# Patient Record
Sex: FEMALE | Race: WHITE | NOT HISPANIC OR LATINO | Employment: OTHER | URBAN - METROPOLITAN AREA
[De-identification: names, ages, dates, MRNs, and addresses within clinical notes are randomized per-mention and may not be internally consistent; named-entity substitution may affect disease eponyms.]

---

## 2020-01-02 ENCOUNTER — RECORDS - HEALTHEAST (OUTPATIENT)
Dept: LAB | Facility: CLINIC | Age: 70
End: 2020-01-02

## 2020-01-03 ENCOUNTER — RECORDS - HEALTHEAST (OUTPATIENT)
Dept: LAB | Facility: CLINIC | Age: 70
End: 2020-01-03

## 2020-01-03 LAB
ALBUMIN UR-MCNC: ABNORMAL MG/DL
APPEARANCE UR: CLEAR
BACTERIA #/AREA URNS HPF: ABNORMAL HPF
BILIRUB UR QL STRIP: NEGATIVE
CAOX CRY #/AREA URNS HPF: PRESENT /[HPF]
COLOR UR AUTO: YELLOW
GLUCOSE UR STRIP-MCNC: NEGATIVE MG/DL
HGB UR QL STRIP: NEGATIVE
HYALINE CASTS #/AREA URNS LPF: ABNORMAL LPF
KETONES UR STRIP-MCNC: NEGATIVE MG/DL
LEUKOCYTE ESTERASE UR QL STRIP: ABNORMAL
MUCOUS THREADS #/AREA URNS LPF: ABNORMAL LPF
NITRATE UR QL: NEGATIVE
PH UR STRIP: 7.5 [PH] (ref 4.5–8)
RBC #/AREA URNS AUTO: ABNORMAL HPF
SP GR UR STRIP: 1.01 (ref 1–1.03)
SQUAMOUS #/AREA URNS AUTO: ABNORMAL LPF
UROBILINOGEN UR STRIP-ACNC: ABNORMAL
WBC #/AREA URNS AUTO: ABNORMAL HPF

## 2020-01-04 LAB — BACTERIA SPEC CULT: NO GROWTH

## 2020-01-06 LAB
ALT SERPL W P-5'-P-CCNC: 10 U/L (ref 0–45)
ANION GAP SERPL CALCULATED.3IONS-SCNC: 10 MMOL/L (ref 5–18)
APTT PPP: 47 SECONDS (ref 24–37)
BUN SERPL-MCNC: 8 MG/DL (ref 8–22)
CALCIUM SERPL-MCNC: 8.8 MG/DL (ref 8.5–10.5)
CHLORIDE BLD-SCNC: 105 MMOL/L (ref 98–107)
CO2 SERPL-SCNC: 22 MMOL/L (ref 22–31)
CREAT SERPL-MCNC: 0.69 MG/DL (ref 0.6–1.1)
ERYTHROCYTE [DISTWIDTH] IN BLOOD BY AUTOMATED COUNT: 13.8 % (ref 11–14.5)
GFR SERPL CREATININE-BSD FRML MDRD: >60 ML/MIN/1.73M2
GLUCOSE BLD-MCNC: 96 MG/DL (ref 70–125)
HCT VFR BLD AUTO: 29.8 % (ref 35–47)
HGB BLD-MCNC: 9.1 G/DL (ref 12–16)
MCH RBC QN AUTO: 29.1 PG (ref 27–34)
MCHC RBC AUTO-ENTMCNC: 30.5 G/DL (ref 32–36)
MCV RBC AUTO: 95 FL (ref 80–100)
PLATELET # BLD AUTO: 321 THOU/UL (ref 140–440)
PMV BLD AUTO: 8.9 FL (ref 8.5–12.5)
POTASSIUM BLD-SCNC: 4.4 MMOL/L (ref 3.5–5)
RBC # BLD AUTO: 3.13 MILL/UL (ref 3.8–5.4)
SODIUM SERPL-SCNC: 137 MMOL/L (ref 136–145)
WBC: 5.6 THOU/UL (ref 4–11)

## 2020-01-08 ENCOUNTER — RECORDS - HEALTHEAST (OUTPATIENT)
Dept: LAB | Facility: CLINIC | Age: 70
End: 2020-01-08

## 2020-01-08 LAB — INR PPP: 1.37 (ref 0.9–1.1)

## 2020-01-09 ENCOUNTER — RECORDS - HEALTHEAST (OUTPATIENT)
Dept: LAB | Facility: CLINIC | Age: 70
End: 2020-01-09

## 2020-01-10 LAB
APTT PPP: 42 SECONDS (ref 24–37)
INR PPP: 1.3 (ref 0.9–1.1)

## 2020-01-12 ENCOUNTER — RECORDS - HEALTHEAST (OUTPATIENT)
Dept: LAB | Facility: CLINIC | Age: 70
End: 2020-01-12

## 2020-01-13 LAB
ALT SERPL W P-5'-P-CCNC: <9 U/L (ref 0–45)
ANION GAP SERPL CALCULATED.3IONS-SCNC: 10 MMOL/L (ref 5–18)
BASOPHILS # BLD AUTO: 0.1 THOU/UL (ref 0–0.2)
BASOPHILS NFR BLD AUTO: 1 % (ref 0–2)
BUN SERPL-MCNC: 11 MG/DL (ref 8–22)
CALCIUM SERPL-MCNC: 9.1 MG/DL (ref 8.5–10.5)
CHLORIDE BLD-SCNC: 107 MMOL/L (ref 98–107)
CO2 SERPL-SCNC: 22 MMOL/L (ref 22–31)
CREAT SERPL-MCNC: 0.68 MG/DL (ref 0.6–1.1)
EOSINOPHIL # BLD AUTO: 0.3 THOU/UL (ref 0–0.4)
EOSINOPHIL NFR BLD AUTO: 5 % (ref 0–6)
ERYTHROCYTE [DISTWIDTH] IN BLOOD BY AUTOMATED COUNT: 14.3 % (ref 11–14.5)
GFR SERPL CREATININE-BSD FRML MDRD: >60 ML/MIN/1.73M2
GLUCOSE BLD-MCNC: 97 MG/DL (ref 70–125)
HCT VFR BLD AUTO: 34 % (ref 35–47)
HGB BLD-MCNC: 10.5 G/DL (ref 12–16)
LYMPHOCYTES # BLD AUTO: 1.7 THOU/UL (ref 0.8–4.4)
LYMPHOCYTES NFR BLD AUTO: 28 % (ref 20–40)
MCH RBC QN AUTO: 28.7 PG (ref 27–34)
MCHC RBC AUTO-ENTMCNC: 30.9 G/DL (ref 32–36)
MCV RBC AUTO: 93 FL (ref 80–100)
MONOCYTES # BLD AUTO: 0.5 THOU/UL (ref 0–0.9)
MONOCYTES NFR BLD AUTO: 8 % (ref 2–10)
NEUTROPHILS # BLD AUTO: 3.4 THOU/UL (ref 2–7.7)
NEUTROPHILS NFR BLD AUTO: 56 % (ref 50–70)
PLATELET # BLD AUTO: 312 THOU/UL (ref 140–440)
PMV BLD AUTO: 8.8 FL (ref 8.5–12.5)
POTASSIUM BLD-SCNC: 4.1 MMOL/L (ref 3.5–5)
RBC # BLD AUTO: 3.66 MILL/UL (ref 3.8–5.4)
SODIUM SERPL-SCNC: 139 MMOL/L (ref 136–145)
WBC: 6 THOU/UL (ref 4–11)

## 2020-01-17 ENCOUNTER — RECORDS - HEALTHEAST (OUTPATIENT)
Dept: LAB | Facility: CLINIC | Age: 70
End: 2020-01-17

## 2020-01-19 ENCOUNTER — RECORDS - HEALTHEAST (OUTPATIENT)
Dept: LAB | Facility: CLINIC | Age: 70
End: 2020-01-19

## 2020-01-20 LAB
ANION GAP SERPL CALCULATED.3IONS-SCNC: 12 MMOL/L (ref 5–18)
APTT PPP: 32 SECONDS (ref 24–37)
BUN SERPL-MCNC: 11 MG/DL (ref 8–22)
CALCIUM SERPL-MCNC: 9.3 MG/DL (ref 8.5–10.5)
CHLORIDE BLD-SCNC: 105 MMOL/L (ref 98–107)
CO2 SERPL-SCNC: 23 MMOL/L (ref 22–31)
CREAT SERPL-MCNC: 0.67 MG/DL (ref 0.6–1.1)
ERYTHROCYTE [DISTWIDTH] IN BLOOD BY AUTOMATED COUNT: 14.7 % (ref 11–14.5)
GFR SERPL CREATININE-BSD FRML MDRD: >60 ML/MIN/1.73M2
GLUCOSE BLD-MCNC: 90 MG/DL (ref 70–125)
HCT VFR BLD AUTO: 38 % (ref 35–47)
HGB BLD-MCNC: 11.5 G/DL (ref 12–16)
INR PPP: 1.12 (ref 0.9–1.1)
MCH RBC QN AUTO: 29 PG (ref 27–34)
MCHC RBC AUTO-ENTMCNC: 30.3 G/DL (ref 32–36)
MCV RBC AUTO: 96 FL (ref 80–100)
PLATELET # BLD AUTO: 253 THOU/UL (ref 140–440)
PMV BLD AUTO: 9.4 FL (ref 8.5–12.5)
POTASSIUM BLD-SCNC: 4.2 MMOL/L (ref 3.5–5)
RBC # BLD AUTO: 3.97 MILL/UL (ref 3.8–5.4)
SODIUM SERPL-SCNC: 140 MMOL/L (ref 136–145)
WBC: 6 THOU/UL (ref 4–11)

## 2020-01-21 LAB — ALT SERPL W P-5'-P-CCNC: <9 U/L (ref 0–45)

## 2020-01-24 ENCOUNTER — RECORDS - HEALTHEAST (OUTPATIENT)
Dept: LAB | Facility: CLINIC | Age: 70
End: 2020-01-24

## 2020-01-27 LAB
ALT SERPL W P-5'-P-CCNC: <9 U/L (ref 0–45)
ANION GAP SERPL CALCULATED.3IONS-SCNC: 7 MMOL/L (ref 5–18)
APTT PPP: 32 SECONDS (ref 24–37)
AST SERPL W P-5'-P-CCNC: 18 U/L (ref 0–40)
BUN SERPL-MCNC: 12 MG/DL (ref 8–22)
CALCIUM SERPL-MCNC: 9.2 MG/DL (ref 8.5–10.5)
CHLORIDE BLD-SCNC: 105 MMOL/L (ref 98–107)
CO2 SERPL-SCNC: 29 MMOL/L (ref 22–31)
CREAT SERPL-MCNC: 0.73 MG/DL (ref 0.6–1.1)
ERYTHROCYTE [DISTWIDTH] IN BLOOD BY AUTOMATED COUNT: 14.7 % (ref 11–14.5)
GFR SERPL CREATININE-BSD FRML MDRD: >60 ML/MIN/1.73M2
GLUCOSE BLD-MCNC: 89 MG/DL (ref 70–125)
HCT VFR BLD AUTO: 35.9 % (ref 35–47)
HGB BLD-MCNC: 11 G/DL (ref 12–16)
INR PPP: 1.07 (ref 0.9–1.1)
MCH RBC QN AUTO: 29.4 PG (ref 27–34)
MCHC RBC AUTO-ENTMCNC: 30.6 G/DL (ref 32–36)
MCV RBC AUTO: 96 FL (ref 80–100)
PLATELET # BLD AUTO: 205 THOU/UL (ref 140–440)
PMV BLD AUTO: 9.6 FL (ref 8.5–12.5)
POTASSIUM BLD-SCNC: 3.9 MMOL/L (ref 3.5–5)
RBC # BLD AUTO: 3.74 MILL/UL (ref 3.8–5.4)
SODIUM SERPL-SCNC: 141 MMOL/L (ref 136–145)
WBC: 3.9 THOU/UL (ref 4–11)

## 2020-01-31 ENCOUNTER — RECORDS - HEALTHEAST (OUTPATIENT)
Dept: LAB | Facility: CLINIC | Age: 70
End: 2020-01-31

## 2020-02-02 ENCOUNTER — RECORDS - HEALTHEAST (OUTPATIENT)
Dept: LAB | Facility: CLINIC | Age: 70
End: 2020-02-02

## 2020-02-03 LAB
ALT SERPL W P-5'-P-CCNC: <9 U/L (ref 0–45)
ANION GAP SERPL CALCULATED.3IONS-SCNC: 9 MMOL/L (ref 5–18)
APTT PPP: 30 SECONDS (ref 24–37)
BUN SERPL-MCNC: 12 MG/DL (ref 8–22)
CALCIUM SERPL-MCNC: 8.9 MG/DL (ref 8.5–10.5)
CHLORIDE BLD-SCNC: 104 MMOL/L (ref 98–107)
CO2 SERPL-SCNC: 25 MMOL/L (ref 22–31)
CREAT SERPL-MCNC: 0.68 MG/DL (ref 0.6–1.1)
ERYTHROCYTE [DISTWIDTH] IN BLOOD BY AUTOMATED COUNT: 14.6 % (ref 11–14.5)
GFR SERPL CREATININE-BSD FRML MDRD: >60 ML/MIN/1.73M2
GLUCOSE BLD-MCNC: 120 MG/DL (ref 70–125)
HCT VFR BLD AUTO: 38.7 % (ref 35–47)
HGB BLD-MCNC: 12.1 G/DL (ref 12–16)
INR PPP: 1.06 (ref 0.9–1.1)
MCH RBC QN AUTO: 29.4 PG (ref 27–34)
MCHC RBC AUTO-ENTMCNC: 31.3 G/DL (ref 32–36)
MCV RBC AUTO: 94 FL (ref 80–100)
PLATELET # BLD AUTO: 212 THOU/UL (ref 140–440)
PMV BLD AUTO: 9.7 FL (ref 8.5–12.5)
POTASSIUM BLD-SCNC: 4 MMOL/L (ref 3.5–5)
RBC # BLD AUTO: 4.11 MILL/UL (ref 3.8–5.4)
SODIUM SERPL-SCNC: 138 MMOL/L (ref 136–145)
WBC: 4.5 THOU/UL (ref 4–11)

## 2020-02-23 ENCOUNTER — HEALTH MAINTENANCE LETTER (OUTPATIENT)
Age: 70
End: 2020-02-23

## 2020-12-07 ENCOUNTER — THERAPY VISIT (OUTPATIENT)
Dept: PHYSICAL THERAPY | Facility: CLINIC | Age: 70
End: 2020-12-07
Payer: MEDICARE

## 2020-12-07 DIAGNOSIS — M54.16 LUMBAR RADICULOPATHY: ICD-10-CM

## 2020-12-07 DIAGNOSIS — Z91.81 AT HIGH RISK FOR FALLS: ICD-10-CM

## 2020-12-07 PROCEDURE — 97112 NEUROMUSCULAR REEDUCATION: CPT | Mod: GP | Performed by: PHYSICAL THERAPIST

## 2020-12-07 PROCEDURE — 97161 PT EVAL LOW COMPLEX 20 MIN: CPT | Mod: GP | Performed by: PHYSICAL THERAPIST

## 2020-12-07 NOTE — PROGRESS NOTES
[facility] Initial Evaluation  Subjective:    Patient Health History  Francia Isaac being seen for Recovery from ARMIN, which has left me with nerve interruption in the saddle area, the left hamstring, left heel, and outer third of the left foot. Minimal lower back pain that is actually more from L2 when I do have it..     Problem began: 12/16/2019.   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  General health as reported by patient is good.  Pertinent medical history includes: cancer, high blood pressure, menopausal, numbness/tingling, overweight, osteoarthritis, pain at night/rest and other. 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     Medical allergies: other. Other medical allergies details: Sulfa.   Surgeries include:  Other. Other surgery history details: deviated septum; tubal ligation; hysterectomy; carpal tunnel release; L3-4-5 fusion; inverted papilloma removal and follow-up clean-up (3); back surgery for ARMIN relief and repair (3).    Current medications:  High blood pressure medication.    Current occupation is Retired.   Primary job tasks include:  Computer work and lifting/carrying.   Other job/home tasks details: Cleaning, gardening, hiking.                Therapist Generated HPI Evaluation  Problem details: Has thoracic outlet syndrome. Current fall this morning that caused a headache. Not back pain, Left leg numbness and weakness. walks pretty consistently two miles a day on property on uneven surface. Tried biking for some time but, had issues. Numbness in glutes/hamstring some of the lateral Left foot heel, R glute slightly numb. Cannot walk on L foot toes. Pain with squatting and sneezing in the inner groin . Wants to be able to bike more frequently with safety   .         Type of problem:  Sacroiliac and lumbar.    This is a chronic  condition.  Occurance: unknown   Where condition occurred: at home.  Patient reports pain:  SI joint left, SI joint right and lower lumbar spine.  Pain quality: numbness and tingling  and is constant.  Pain radiates to:  Gluteals left, gluteals right, thigh left, foot left and knee left. Pain timing: does not vary much   Since onset symptoms are unchanged.  Associated symptoms:  Fatigue, loss of balance, loss of strength, tingling and numbness. Symptoms are exacerbated by bending and lifting  and relieved by nothing.    Previous treatment includes physical therapy. There was mild improvement following previous treatment.  Work activity restrictions: retired   Barriers include:  None as reported by patient.      Oswestry Score: 16 %                 Objective:  Francia PAT Isaac , : 1950, MRN: [id number]    Physical Therapy Objective Findings  Subjective information, goals, clinical impression, daily documentation and other information found in EPISODES tab.  Objective:     Lumbar Pain    Posture: flat mid back   Lumbar Range of Motion:  Flexion                                                    100%  No increased in symptoms                                                                                                                        Extension 85% some stiffness in low back    Right Side Bending 80%   Left Side Bending 80%   Repeated extension- standing    Repeated flexion- standing      Pelvic screen:                                                                         Positive                                            Negative                                             Standing Forward Bend     Gillet(March)  Unable to attempt due to balance    Supine to sit     Sacroiliac provocation test  -   Pubic symphysis provocation            -resisted hip add at 45     Other:       Hip Screen:                                                                  Positive                                              Negative                                             Hip ROM Limited hip extension -due to tightness    Matty LESLIE     Other:     Manual Muscle Testing (graded 0-5, measured at 0 degrees unless otherwise noted):                                                                              Right                                  Left                                                     Transversus Abdominus     -Rubin Leg Lowering (deg)     Hip Flex L2 4- 4-   Hip Abd     Hip Add     Hip Ext     Knee Flex 4 4-   Knee Ext L3 4 4-   Ankle Dorsiflexion L4 4+ 4   Great Toe Extension L5     Ankle Plantar Flexion S1 4+ 4   Other:     (+ mild pain, ++ moderate pain, +++ severe pain)    Special Tests:                                                                     Positive                                             Negative                                             Sign of Buttock  x   SLR  x   Mauro Test Bilateral rectus femoris tightness     Ely Test     Prone instability Test     Crossover SLR     Repeated extension prone     Other:  slump test   sacral thrust   gaenslens  SI gapping    BIN 3 min  Repeated knees to chest      +     X    X  X    No change  No change       Segmental Mobility: no pain or tenderness with L1-L5    Palpation:   -If symptoms past hip, must do neuro testing  Dermatome/Sensory  Testing: L S1 absent sensation  Reflex Testing:                                                                 Right                                                  Left                                                     Patellar Tendon 2 2   Achilles Tendon 2 2   Babinski         System    Physical Exam    General     ROS    Assessment/Plan:    Patient is a 70 year old female with lumbar complaints.    Patient has the following significant findings with corresponding treatment plan.                Diagnosis 1:  Lower extremity numbness consistent with pinched nerve, possible  neuropathy  Impaired balance - neuro re-education, gait training, therapeutic activities and home program  Decreased proprioception - neuro re-education, gait training, therapeutic activities and home program  Impaired gait - gait training and home program  Impaired muscle performance - neuro re-education and home program  Decreased function - therapeutic activities and home program    Therapy Evaluation Codes:   1) History comprised of:   Personal factors that impact the plan of care:      None.    Comorbidity factors that impact the plan of care are:      Cancer, High blood pressure, Menopausal, Numbness/tingling, Osteoarthritis, Overweight and Pain at night/rest.     Medications impacting care: High blood pressure.  2) Examination of Body Systems comprised of:   Body structures and functions that impact the plan of care:      Lumbar spine Lower extremity .   Activity limitations that impact the plan of care are:      Squatting/kneeling, Walking and Sleeping.  3) Clinical presentation characteristics are:   Stable/Uncomplicated.  4) Decision-Making    Low complexity using standardized patient assessment instrument and/or measureable assessment of functional outcome.  Cumulative Therapy Evaluation is: Low complexity.    Previous and current functional limitations:  (See Goal Flow Sheet for this information)    Short term and Long term goals: (See Goal Flow Sheet for this information)     Communication ability:  Patient appears to be able to clearly communicate and understand verbal and written communication and follow directions correctly.  Treatment Explanation - The following has been discussed with the patient:   RX ordered/plan of care  Anticipated outcomes  Possible risks and side effects  This patient would benefit from PT intervention to resume normal activities.   Rehab potential is fair.    Frequency:  1 X week, once daily  Duration:  for 8 weeks  Discharge Plan:  Achieve all LTG.  Independent in home  treatment program.  Reach maximal therapeutic benefit.    Please refer to the daily flowsheet for treatment today, total treatment time and time spent performing 1:1 timed codes.     Anshul Seaman, SPT, Ricardo Rodríguez, EDIT O[facility]

## 2020-12-07 NOTE — LETTER
DEPARTMENT OF HEALTH AND HUMAN SERVICES  [facility]    PLAN/UPDATED PLAN OF PROGRESS FOR OUTPATIENT REHABILITATION          PATIENTS NAME:  Francia Isaac     : 1950    PROVIDER NUMBER:    [id number]    HICN:  [id number]    PROVIDER NAME: [facility]    MEDICAL RECORD NUMBER: [id number]     START OF CARE DATE:  SOC Date: 20   TYPE:  PT    PRIMARY/TREATMENT DIAGNOSIS: (Pertinent Medical Diagnosis)     Lumbar radiculopathy  At high risk for falls    VISITS FROM START OF CARE:  Rxs Used: 1     [facility] Initial Evaluation  Subjective:    Patient Health History  Francia Isaac being seen for Recovery from ARMIN, which has left me with nerve interruption in the saddle area, the left hamstring, left heel, and outer third of the left foot. Minimal lower back pain that is actually more from L2 when I do have it..   Problem began: 2019.   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  General health as reported by patient is good.  Pertinent medical history includes: cancer, high blood pressure, menopausal, numbness/tingling, overweight, osteoarthritis, pain at night/rest and other. 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   Medical allergies: other. Other medical allergies details: Sulfa.   Surgeries include:  Other. Other surgery history details: deviated septum; tubal ligation; hysterectomy; carpal tunnel release; L3-4-5 fusion; inverted papilloma removal and follow-up clean-up (3); back surgery for ARMIN relief and repair (3).    Current medications:  High blood pressure medication.    Current occupation is Retired.   Primary job tasks include:  Computer work and lifting/carrying.   Other job/home tasks details: Cleaning, gardening, hiking.                Therapist  Generated HPI Evaluation  Problem details: Has thoracic outlet syndrome. Current fall this morning that caused a headache. Not back pain, Left leg numbness and weakness. walks pretty consistently   PATIENTS NAME:  Francia Isaac   : 1950  Page 2    two miles a day on property on uneven surface. Tried biking for some time but, had issues. Numbness in glutes/hamstring some of the lateral Left foot heel, R glute slightly numb. Cannot walk on L foot toes. Pain with squatting and sneezing in the inner groin . Wants to be able to bike more frequently with safety   Type of problem:  Sacroiliac and lumbar.  This is a chronic condition.  Occurance: unknown   Where condition occurred: at home.  Patient reports pain:  SI joint left, SI joint right and lower lumbar spine.  Pain quality: numbness and tingling  and is constant.  Pain radiates to:  Gluteals left, gluteals right, thigh left, foot left and knee left. Pain timing: does not vary much   Since onset symptoms are unchanged.  Associated symptoms:  Fatigue, loss of balance, loss of strength, tingling and numbness. Symptoms are exacerbated by bending and lifting  and relieved by nothing.  Previous treatment includes physical therapy. There was mild improvement following previous treatment.  Work activity restrictions: retired   Barriers include:  None as reported by patient.    Oswestry Score: 16 %               Objective:    Francia Isaac VALENTINO: 1950, MRN: [id number]  Physical Therapy Objective FindingsSubjective information, goals, clinical impression, daily documentation and other information found in EPISODES tab.  Objective: Lumbar Pain  Posture: flat mid back   Lumbar Range of Motion:  Flexion                                                    100%  No increased in symptoms                                                                                                                        Extension 85% some stiffness in low back    Right Side Bending  80%   Left Side Bending 80%   Repeated extension- standing    Repeated flexion- standing      Pelvic screen:                                                                         Positive                                            Negative                                             Standing Forward Bend     Gillet(March)  Unable to attempt due to balance    Supine to sit     Sacroiliac provocation test  -   Pubic symphysis provocation            -resisted hip add at 46     Other:       Hip Screen:                                                                  Positive                                             Negative                                             Hip ROM Limited hip extension -due to tightness    Matty LESLIE     Other:     Manual Muscle Testing (graded 0-5, measured at 0 degrees unless otherwise noted):                                                                              Right                                  Left                                                     Transversus Abdominus     -Rubin Leg Lowering (deg)     Hip Flex L2 4- 4-   Hip Abd     Hip Add     Hip Ext     Knee Flex 4 4-   Knee Ext L3 4 4-   Ankle Dorsiflexion L4 4+ 4   Great Toe Extension L5     Ankle Plantar Flexion S1 4+ 4   Other:     (+ mild pain, ++ moderate pain, +++ severe pain)    Special Tests:                                                                     Positive                                             Negative                                             Sign of Buttock  x   SLR  x   Mauro Test Bilateral rectus femoris tightness     Ely Test     Prone instability Test     Crossover SLR     Repeated extension prone     Other:  slump test   sacral thrust   chuckie  SI gapping    BIN 3 min  Repeated knees to chest      +     X    X  X    No change  No change     Segmental Mobility: no pain or tenderness with L1-L5    Palpation:   If symptoms past hip, must do neuro  testing  Dermatome/Sensory  Testing: L S1 absent sensation  PATIENTS NAME:  Francia Isaac   : 1950  Page 4  Reflex Testing:                                                                 Right                                                  Left                                                     Patellar Tendon 2 2   Achilles Tendon 2 2   Babinski         Assessment/Plan:    Patient is a 70 year old female with lumbar complaints.    Patient has the following significant findings with corresponding treatment plan.                Diagnosis 1:  Lower extremity numbness consistent with pinched nerve, possible neuropathy  Impaired balance - neuro re-education, gait training, therapeutic activities and home program  Decreased proprioception - neuro re-education, gait training, therapeutic activities and home program  Impaired gait - gait training and home program  Impaired muscle performance - neuro re-education and home program  Decreased function - therapeutic activities and home program    Therapy Evaluation Codes:   1) History comprised of:   Personal factors that impact the plan of care:      None.    Comorbidity factors that impact the plan of care are:      Cancer, High blood pressure, Menopausal, Numbness/tingling, Osteoarthritis, Overweight and Pain at night/rest.     Medications impacting care: High blood pressure.  2) Examination of Body Systems comprised of:   Body structures and functions that impact the plan of care:      Lumbar spine Lower extremity .   Activity limitations that impact the plan of care are:      Squatting/kneeling, Walking and Sleeping.  3) Clinical presentation characteristics are:   Stable/Uncomplicated.  4) Decision-Making    Low complexity using standardized patient assessment instrument and/or measureable assessment of functional outcome.  Cumulative Therapy Evaluation is: Low complexity.    Previous and current functional limitations:  (See Goal Flow Sheet for this  "information)    Short term and Long term goals: (See Goal Flow Sheet for this information)     Communication ability:  Patient appears to be able to clearly communicate and understand verbal and written communication and follow directions correctly.  Treatment Explanation - The following has been discussed with the patient:   RX ordered/plan of care  PATIENTS NAME:  Francia Isaac   : 1950  Page 5    Anticipated outcomes  Possible risks and side effects  This patient would benefit from PT intervention to resume normal activities.   Rehab potential is fair.  Frequency:  1 X week, once daily  Duration:  for 8 weeks  Discharge Plan:  Achieve all LTG.  Independent in home treatment program.  Reach maximal therapeutic benefit.      Caregiver Signature/Credentials _____________________________ Date ________       Treating Provider:  Izzy Seaman, SPT, Ricardo Rodríguez, DPT [facility] Anne DPT [facility]       have reviewed and certified the need for these services and plan of treatment while under my care.        PHYSICIAN'S SIGNATURE:   _________________________________________  Date___________   Odin Bryant MD    Certification period:  Beginning of Cert date period: 20 to  End of Cert period date: 02/15/21     Functional Level Progress Report: Please see attached \"Goal Flow sheet for Functional level.\"    ____X____ Continue Services or       ________ DC Services                Service dates: From  SOC Date: 20 date to present                         "

## 2020-12-14 ENCOUNTER — THERAPY VISIT (OUTPATIENT)
Dept: PHYSICAL THERAPY | Facility: CLINIC | Age: 70
End: 2020-12-14
Payer: MEDICARE

## 2020-12-14 DIAGNOSIS — M54.16 LUMBAR RADICULOPATHY: ICD-10-CM

## 2020-12-14 DIAGNOSIS — Z91.81 AT HIGH RISK FOR FALLS: ICD-10-CM

## 2020-12-14 PROCEDURE — 97112 NEUROMUSCULAR REEDUCATION: CPT | Mod: GP | Performed by: PHYSICAL THERAPIST

## 2020-12-14 PROCEDURE — 97140 MANUAL THERAPY 1/> REGIONS: CPT | Mod: GP | Performed by: PHYSICAL THERAPIST

## 2020-12-21 ENCOUNTER — THERAPY VISIT (OUTPATIENT)
Dept: PHYSICAL THERAPY | Facility: CLINIC | Age: 70
End: 2020-12-21
Payer: MEDICARE

## 2020-12-21 DIAGNOSIS — Z91.81 AT HIGH RISK FOR FALLS: ICD-10-CM

## 2020-12-21 DIAGNOSIS — M54.16 LUMBAR RADICULOPATHY: ICD-10-CM

## 2020-12-21 PROCEDURE — 97110 THERAPEUTIC EXERCISES: CPT | Mod: GP | Performed by: PHYSICAL THERAPIST

## 2020-12-21 PROCEDURE — 97112 NEUROMUSCULAR REEDUCATION: CPT | Mod: GP | Performed by: PHYSICAL THERAPIST

## 2021-01-05 ENCOUNTER — THERAPY VISIT (OUTPATIENT)
Dept: PHYSICAL THERAPY | Facility: CLINIC | Age: 71
End: 2021-01-05
Payer: MEDICARE

## 2021-01-05 DIAGNOSIS — M54.16 LUMBAR RADICULOPATHY: ICD-10-CM

## 2021-01-05 DIAGNOSIS — Z91.81 AT HIGH RISK FOR FALLS: ICD-10-CM

## 2021-01-05 PROCEDURE — 97112 NEUROMUSCULAR REEDUCATION: CPT | Mod: GP | Performed by: PHYSICAL THERAPIST

## 2021-01-05 PROCEDURE — 97110 THERAPEUTIC EXERCISES: CPT | Mod: GP | Performed by: PHYSICAL THERAPIST

## 2021-01-12 ENCOUNTER — THERAPY VISIT (OUTPATIENT)
Dept: PHYSICAL THERAPY | Facility: CLINIC | Age: 71
End: 2021-01-12
Payer: MEDICARE

## 2021-01-12 DIAGNOSIS — Z91.81 AT HIGH RISK FOR FALLS: ICD-10-CM

## 2021-01-12 DIAGNOSIS — M54.16 LUMBAR RADICULOPATHY: ICD-10-CM

## 2021-01-12 PROCEDURE — 97140 MANUAL THERAPY 1/> REGIONS: CPT | Mod: GP | Performed by: PHYSICAL THERAPIST

## 2021-01-12 PROCEDURE — 97112 NEUROMUSCULAR REEDUCATION: CPT | Mod: GP | Performed by: PHYSICAL THERAPIST

## 2021-01-12 PROCEDURE — 97110 THERAPEUTIC EXERCISES: CPT | Mod: GP | Performed by: PHYSICAL THERAPIST

## 2021-01-19 ENCOUNTER — THERAPY VISIT (OUTPATIENT)
Dept: PHYSICAL THERAPY | Facility: CLINIC | Age: 71
End: 2021-01-19
Payer: MEDICARE

## 2021-01-19 DIAGNOSIS — Z91.81 AT HIGH RISK FOR FALLS: ICD-10-CM

## 2021-01-19 DIAGNOSIS — M54.16 LUMBAR RADICULOPATHY: ICD-10-CM

## 2021-01-19 PROCEDURE — 97112 NEUROMUSCULAR REEDUCATION: CPT | Mod: GP | Performed by: PHYSICAL THERAPY ASSISTANT

## 2021-01-19 PROCEDURE — 97110 THERAPEUTIC EXERCISES: CPT | Mod: GP | Performed by: PHYSICAL THERAPY ASSISTANT

## 2021-03-22 PROBLEM — M54.16 LUMBAR RADICULOPATHY: Status: RESOLVED | Noted: 2020-12-07 | Resolved: 2021-03-22

## 2021-03-22 PROBLEM — Z91.81 AT HIGH RISK FOR FALLS: Status: RESOLVED | Noted: 2020-12-07 | Resolved: 2021-03-22

## 2021-03-22 NOTE — PROGRESS NOTES
Discharge Note    Progress reporting period is from initial evaluation date (please see noted date below) to Jan 19, 2021.  Linked Episodes   Type: Episode: Status: Noted: Resolved: Last update: Updated by:   PHYSICAL THERAPY Low back pain - 12/7/2020 Active 12/7/2020 1/19/2021 11:43 AM Ricardo Rodríguez, PT      Comments:       Francia failed to follow up and current status is unknown.  Please see information below for last relevant information on current status.  Patient seen for 6 visits.    SUBJECTIVE  Subjective changes noted by patient:  Pt has been participating in snow shoeing activity daily for 30-45 min. Symptoms of ache in low back upon waking in the AM lasting about 1 hour. Pt pleased with increased stability in L LE/foot.   .  Current pain level is 0/10.     Previous pain level was  0/10.   Changes in function:  Yes (See Goal flowsheet attached for changes in current functional level)  Adverse reaction to treatment or activity: None    OBJECTIVE  Changes noted in objective findings: Needs to concentrate on heel to toe gait pattern. SLS L LE ~45 seconds with moderate effort. Tightness in L quad/hip flexor region.      ASSESSMENT/PLAN  Diagnosis: low ext numbness consistent with pinched nerve    Updated problem list and treatment plan:   Decreased function - HEP  Impaired balance - HEP  STG/LTGs have been met or progress has been made towards goals:  Yes, please see goal flowsheet for most current information  Assessment of Progress: current status is unknown.    Last current status:     Self Management Plans:  HEP  I have re-evaluated this patient and find that the nature, scope, duration and intensity of the therapy is appropriate for the medical condition of the patient.  Francia continues to require the following intervention to meet STG and LTG's:  HEP.    Recommendations:  Discharge with current home program.  Patient to follow up with MD as needed.    Please refer to the daily flowsheet for treatment  today, total treatment time and time spent performing 1:1 timed codes.    Ricardo Rodríguez,PT, DPT, OCS

## 2021-04-11 ENCOUNTER — HEALTH MAINTENANCE LETTER (OUTPATIENT)
Age: 71
End: 2021-04-11

## 2021-09-26 ENCOUNTER — HEALTH MAINTENANCE LETTER (OUTPATIENT)
Age: 71
End: 2021-09-26

## 2022-03-13 ENCOUNTER — HEALTH MAINTENANCE LETTER (OUTPATIENT)
Age: 72
End: 2022-03-13

## 2022-05-08 ENCOUNTER — HEALTH MAINTENANCE LETTER (OUTPATIENT)
Age: 72
End: 2022-05-08

## 2023-01-08 ENCOUNTER — HEALTH MAINTENANCE LETTER (OUTPATIENT)
Age: 73
End: 2023-01-08

## 2023-03-23 ENCOUNTER — TRANSCRIBE ORDERS (OUTPATIENT)
Dept: OTHER | Age: 73
End: 2023-03-23

## 2023-03-23 DIAGNOSIS — M48.07 LUMBOSACRAL STENOSIS: Primary | ICD-10-CM

## 2023-03-30 ENCOUNTER — THERAPY VISIT (OUTPATIENT)
Dept: PHYSICAL THERAPY | Facility: CLINIC | Age: 73
End: 2023-03-30
Attending: FAMILY MEDICINE
Payer: MEDICARE

## 2023-03-30 DIAGNOSIS — G57.02 PIRIFORMIS SYNDROME, LEFT: ICD-10-CM

## 2023-03-30 DIAGNOSIS — M54.16 LUMBAR RADICULOPATHY: ICD-10-CM

## 2023-03-30 PROCEDURE — 97110 THERAPEUTIC EXERCISES: CPT | Mod: GP | Performed by: PHYSICAL THERAPIST

## 2023-03-30 PROCEDURE — 97161 PT EVAL LOW COMPLEX 20 MIN: CPT | Mod: GP | Performed by: PHYSICAL THERAPIST

## 2023-03-30 PROCEDURE — 97140 MANUAL THERAPY 1/> REGIONS: CPT | Mod: GP | Performed by: PHYSICAL THERAPIST

## 2023-03-30 NOTE — PROGRESS NOTES
[facility]    OUTPATIENT Physical Therapy ORTHOPEDIC EVALUATION  PLAN OF TREATMENT FOR OUTPATIENT REHABILITATION  (COMPLETE FOR INITIAL CLAIMS ONLY)  Patient's Last Name, First Name, M.I.  YOB: 1950  Francia Isaac    Provider s Name:  [facility]   Medical Record No.  [id number]   Start of Care Date:  03/30/23   Onset Date:   03/23/23 (MD referral)   Treatment Diagnosis:  Lumbarstenosis - Left Piriformis syndrome (- nerve tension) Medical Diagnosis:     Lumbar radiculopathy  Piriformis syndrome, left       Goals:     03/30/23 0500   Body Part   Goals listed below are for LBP   Goal #1   Goal #1 sitting   Previous Functional Level No restrictions   Current Functional Level Hours patient can sit   Performance level 1 with PL = 4/10 L buttock   STG Target Performance Hours patient will be able to sit   Performance level 1 with PL = 0-1/10 in left buttock   Rationale for personal hygiene;to allow rest from standing;for community transportation   Due date 05/11/23   LTG Target Performance Hours patient will be able to sit   Performance Level > 1 w/o pain in L buttock   Rationale for personal hygiene;to allow rest from standing;for community transportation   Due date 06/22/23         Therapy Frequency:  1 x week  Predicted Duration of Therapy Intervention:  12 weeks    Shara Bronson, PT                 I CERTIFY THE NEED FOR THESE SERVICES FURNISHED UNDER        THIS PLAN OF TREATMENT AND WHILE UNDER MY CARE .             Physician Signature               Date    X_____________________________________________________                         Certification Date From:  03/30/23   Certification Date To:  06/27/23    Referring Provider:  Hermelinda Colin    Initial Assessment        See Epic Evaluation SOC Date: 03/30/23

## 2023-03-30 NOTE — PROGRESS NOTES
Physical Therapy Initial Evaluation  Subjective:  The history is provided by the patient. No  was used.   Patient Health History  Francia Isaac being seen for Pain Left Glut and post thigh and left foot.     Date of Onset: MD referral 3/23/23.   Problem occurred: Unknown but noticed afet bowling   Pain is reported as 2/10 on pain scale.  General health as reported by patient is good.  Pertinent medical history includes: cancer, overweight, high blood pressure, incontinence and numbness/tingling.   Red flags:  Cold/hot extremity, foot drop and pain at rest/night.  Medical allergies: other. Other medical allergies details: sulfa.   Surgeries include:  Orthopedic surgery and other. Other surgery history details: Sinus, hands, laser eyes, hysterectomy, 2010 fusions L3-5, 12/19 & 12/21/19 sacral surgery (cauda equina syndrome) then MSSA infection 12/26/19 L5-S1.    Current medications:  High blood pressure medication and other. Other medications details: cholesterol.    Current occupation is Retired.   Primary job tasks include:  Computer work, driving, lifting/carrying and other.   Other job/home tasks details: Daily exercise and yoga.                Therapist Generated HPI Evaluation         Type of problem:  Lumbar.    This is a chronic condition.    Where condition occurred: for unknown reasons.    Pain is described as aching and is constant.  Pain radiates to:  Gluteals left and thigh left. Pain is the same all the time.    Associated symptoms:  Loss of motion/stiffness and loss of strength. Symptoms are exacerbated by bending and twisting (bowling)  Relieved by: seated piriformis stretches.                              Objective:  Standing Alignment:          Pelvic:  Iliac crest high R (Leg Length Discrepency (L shorter then right))          Gait:    Gait Type:  Antalgic   Assistive Devices:  None      Flexibility/Screens:   Positive screens:  Lumbar    Lower Extremity:  Decreased left lower  extremity flexibility:Piriformis                 Lumbar/SI Evaluation  ROM:    AROM Lumbar:   Flexion:       Hands to Feet  Ext:                      Side Bend:        Left:     Right:   Rotation:           Left:     Right:   Side Glide:        Left:     Right:         Strength: Left Hip ABD = 4-  Lumbar Myotomes:    T12-L3 (Hip Flex):  Left: 4+    Right: 4+  L2-4 (Quads):  Left:  4+    Right:  4+  L4 (Ankle DF):  Left:  4-    Right:  4+  L5 (Great Toe Ext): Left: 4-    Right: 4+   S1 (Toe Raise):  Left: 4-    Right: 4+  Lumbar DTR's:  not assessed      Cord Signs:  not assessed    Lumbar Dermtomes:  not assessed                Neural Tension/Mobility:  Lumbar:  Normal      Left side:SLR or Slump  negative.     Lumbar Palpation:    Tenderness present at Left:    Piriformis    Functional Tests:  not assessed        Lumbar Provocation:      Left negative with:  PROM hip    Right negative with:  PROM hip    SI joint/Sacrum:          Left negative at:    Squish    Right negative at:  Squish                                                 General     ROS    Assessment/Plan:    Patient is a 72 year old female with lumbar complaints.    Patient has the following significant findings with corresponding treatment plan.                Diagnosis 1:  L Piriformis Syndrome (Leg Length Discrepancy)  Pain -  manual therapy, splint/taping/bracing/orthotics, self management, education, home program and heel lift R shoe  Decreased ROM/flexibility - manual therapy, therapeutic exercise, therapeutic activity and home program  Decreased joint mobility - manual therapy, therapeutic exercise, therapeutic activity and home program  Decreased strength - therapeutic exercise, therapeutic activities and home program  Inflammation - self management/home program  Impaired gait - gait training, assistive devices and home program  Impaired muscle performance - neuro re-education and home program  Decreased function - therapeutic activities and  home program    Therapy Evaluation Codes:   1) History comprised of:   Personal factors that impact the plan of care:      Time since onset of symptoms.    Comorbidity factors that impact the plan of care are:      Depression, High blood pressure, Implanted device, Numbness/tingling, Overweight, Pain at night/rest and Weakness.     Medications impacting care: High blood pressure.  2) Examination of Body Systems comprised of:   Body structures and functions that impact the plan of care:      Lumbar spine.   Activity limitations that impact the plan of care are:      Sitting.  3) Clinical presentation characteristics are:   Stable/Uncomplicated.  4) Decision-Making    Low complexity using standardized patient assessment instrument and/or measureable assessment of functional outcome.  Cumulative Therapy Evaluation is: Low complexity.    Previous and current functional limitations:  (See Goal Flow Sheet for this information)    Short term and Long term goals: (See Goal Flow Sheet for this information)     Communication ability:  Patient appears to be able to clearly communicate and understand verbal and written communication and follow directions correctly.  Treatment Explanation - The following has been discussed with the patient:   RX ordered/plan of care  Anticipated outcomes  Possible risks and side effects  This patient would benefit from PT intervention to resume normal activities.   Rehab potential is good.    Frequency:  1 X week, once daily  Duration:  for 12 weeks  Discharge Plan:  Achieve all LTG.  Independent in home treatment program.  Return to previous functional level by discharge.  Reach maximal therapeutic benefit.    Please refer to the daily flowsheet for treatment today, total treatment time and time spent performing 1:1 timed codes.

## 2023-04-06 ENCOUNTER — THERAPY VISIT (OUTPATIENT)
Dept: PHYSICAL THERAPY | Facility: CLINIC | Age: 73
End: 2023-04-06
Payer: MEDICARE

## 2023-04-06 DIAGNOSIS — G57.02 PIRIFORMIS SYNDROME, LEFT: ICD-10-CM

## 2023-04-06 DIAGNOSIS — M54.16 LUMBAR RADICULOPATHY: Primary | ICD-10-CM

## 2023-04-06 PROCEDURE — 97110 THERAPEUTIC EXERCISES: CPT | Mod: GP | Performed by: PHYSICAL THERAPIST

## 2023-04-06 PROCEDURE — 97140 MANUAL THERAPY 1/> REGIONS: CPT | Mod: GP | Performed by: PHYSICAL THERAPIST

## 2023-04-13 ENCOUNTER — THERAPY VISIT (OUTPATIENT)
Dept: PHYSICAL THERAPY | Facility: CLINIC | Age: 73
End: 2023-04-13
Payer: MEDICARE

## 2023-04-13 DIAGNOSIS — M54.16 LUMBAR RADICULOPATHY: Primary | ICD-10-CM

## 2023-04-13 DIAGNOSIS — G57.02 PIRIFORMIS SYNDROME, LEFT: ICD-10-CM

## 2023-04-13 PROCEDURE — 97140 MANUAL THERAPY 1/> REGIONS: CPT | Mod: GP | Performed by: PHYSICAL THERAPIST

## 2023-04-13 PROCEDURE — 97110 THERAPEUTIC EXERCISES: CPT | Mod: GP | Performed by: PHYSICAL THERAPIST

## 2023-04-20 ENCOUNTER — THERAPY VISIT (OUTPATIENT)
Dept: PHYSICAL THERAPY | Facility: CLINIC | Age: 73
End: 2023-04-20
Payer: MEDICARE

## 2023-04-20 DIAGNOSIS — M54.16 LUMBAR RADICULOPATHY: Primary | ICD-10-CM

## 2023-04-20 DIAGNOSIS — G57.02 PIRIFORMIS SYNDROME, LEFT: ICD-10-CM

## 2023-04-20 PROCEDURE — 97140 MANUAL THERAPY 1/> REGIONS: CPT | Mod: GP | Performed by: PHYSICAL THERAPIST

## 2023-04-20 PROCEDURE — 97110 THERAPEUTIC EXERCISES: CPT | Mod: GP | Performed by: PHYSICAL THERAPIST

## 2023-04-20 NOTE — PROGRESS NOTES
Subjective:  HPI  Physical Exam  Oswestry Score: 8 %                 Objective:  System    Physical Exam    General     ROS    Assessment/Plan:    DISCHARGE REPORT    Progress reporting period is from 3/30/23 to 4/20/23.       SUBJECTIVE  Subjective changes noted by patient: Francia able to bowl w/o aggravationg leg. Left piriformis is feeling much better after tennis ball release. Heel lift in left shoe helpful. Biking 5 mins on stationary bike daily. Sitting as long as likes in comfortable chair w/o pain.    Current Pain level: 1/10.     Initial Pain level: 2/10.   Changes in function:  Yes (See Goal flowsheet attached for changes in current functional level)  Adverse reaction to treatment or activity: None    OBJECTIVE  Changes noted in objective findings:   Normalized gait with heel lift left shoe. Improved left LE strength. Able to perform eccentric left LE toe raises (S1).     ASSESSMENT/PLAN  Updated problem list and treatment plan: Diagnosis 1:  Left Leg Pain & Weakness  Pain -  manual therapy, self management, education and home program  Decreased ROM/flexibility - manual therapy, therapeutic exercise and home program  Decreased strength - therapeutic exercise, therapeutic activities and home program  Inflammation - self management/home program  Impaired gait - gait training and home program  Impaired muscle performance - neuro re-education and home program  Decreased function - therapeutic activities and home program  STG/LTGs have been met or progress has been made towards goals:  Yes (See Goal flow sheet completed today.)  Assessment of Progress: The patient's condition is improving.  Patient is meeting short term goals and is progressing towards long term goals.  Self Management Plans:  Patient is independent in a home treatment program.  Patient is independent in self management of symptoms.  I have re-evaluated this patient and find that the nature, scope, duration and intensity of the therapy is  appropriate for the medical condition of the patient.  Francia continues to require the following intervention to meet STG and LTG's:  PT intervention is no longer required to meet STG/LTG.    Recommendations:  This patient is ready to be discharged from therapy and continue their home treatment program.    Please refer to the daily flowsheet for treatment today, total treatment time and time spent performing 1:1 timed codes.

## 2023-09-24 ENCOUNTER — HEALTH MAINTENANCE LETTER (OUTPATIENT)
Age: 73
End: 2023-09-24

## 2023-10-06 ENCOUNTER — APPOINTMENT (OUTPATIENT)
Dept: CT IMAGING | Facility: OTHER | Age: 73
End: 2023-10-06
Attending: STUDENT IN AN ORGANIZED HEALTH CARE EDUCATION/TRAINING PROGRAM
Payer: COMMERCIAL

## 2023-10-06 ENCOUNTER — HOSPITAL ENCOUNTER (EMERGENCY)
Facility: OTHER | Age: 73
Discharge: HOME | End: 2023-10-06
Attending: STUDENT IN AN ORGANIZED HEALTH CARE EDUCATION/TRAINING PROGRAM | Admitting: STUDENT IN AN ORGANIZED HEALTH CARE EDUCATION/TRAINING PROGRAM
Payer: COMMERCIAL

## 2023-10-06 VITALS
OXYGEN SATURATION: 95 % | SYSTOLIC BLOOD PRESSURE: 149 MMHG | HEIGHT: 64 IN | TEMPERATURE: 99.4 F | RESPIRATION RATE: 16 BRPM | BODY MASS INDEX: 27.66 KG/M2 | HEART RATE: 64 BPM | DIASTOLIC BLOOD PRESSURE: 88 MMHG | WEIGHT: 162 LBS

## 2023-10-06 DIAGNOSIS — R07.81 RIB PAIN ON LEFT SIDE: Primary | ICD-10-CM

## 2023-10-06 DIAGNOSIS — R07.89 LEFT-SIDED CHEST WALL PAIN: ICD-10-CM
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
## 2023-10-06 LAB
TROPONIN T SERPL HS-MCNC: 7 NG/L
TROPONIN T SERPL HS-MCNC: 9 NG/L

## 2023-10-06 PROCEDURE — 96374 THER/PROPH/DIAG INJ IV PUSH: CPT | Performed by: STUDENT IN AN ORGANIZED HEALTH CARE EDUCATION/TRAINING PROGRAM

## 2023-10-06 PROCEDURE — 36415 COLL VENOUS BLD VENIPUNCTURE: CPT | Performed by: STUDENT IN AN ORGANIZED HEALTH CARE EDUCATION/TRAINING PROGRAM

## 2023-10-06 PROCEDURE — 72125 CT NECK SPINE W/O DYE: CPT | Mod: ME

## 2023-10-06 PROCEDURE — 93010 ELECTROCARDIOGRAM REPORT: CPT | Performed by: INTERNAL MEDICINE

## 2023-10-06 PROCEDURE — 99284 EMERGENCY DEPT VISIT MOD MDM: CPT | Performed by: STUDENT IN AN ORGANIZED HEALTH CARE EDUCATION/TRAINING PROGRAM

## 2023-10-06 PROCEDURE — 99285 EMERGENCY DEPT VISIT HI MDM: CPT | Mod: 25 | Performed by: STUDENT IN AN ORGANIZED HEALTH CARE EDUCATION/TRAINING PROGRAM

## 2023-10-06 PROCEDURE — 250N000011 HC RX IP 250 OP 636: Performed by: STUDENT IN AN ORGANIZED HEALTH CARE EDUCATION/TRAINING PROGRAM

## 2023-10-06 PROCEDURE — 71250 CT THORAX DX C-: CPT

## 2023-10-06 PROCEDURE — 93005 ELECTROCARDIOGRAM TRACING: CPT | Performed by: STUDENT IN AN ORGANIZED HEALTH CARE EDUCATION/TRAINING PROGRAM

## 2023-10-06 PROCEDURE — 84484 ASSAY OF TROPONIN QUANT: CPT | Mod: 91 | Performed by: STUDENT IN AN ORGANIZED HEALTH CARE EDUCATION/TRAINING PROGRAM

## 2023-10-06 RX ORDER — KETOROLAC TROMETHAMINE 15 MG/ML
15 INJECTION, SOLUTION INTRAMUSCULAR; INTRAVENOUS ONCE
Status: COMPLETED | OUTPATIENT
Start: 2023-10-06 | End: 2023-10-06

## 2023-10-06 RX ADMIN — KETOROLAC TROMETHAMINE 15 MG: 15 INJECTION INTRAMUSCULAR; INTRAVENOUS at 21:15

## 2023-10-06 ASSESSMENT — ACTIVITIES OF DAILY LIVING (ADL)
ADLS_ACUITY_SCORE: 35
ADLS_ACUITY_SCORE: 35
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
## 2023-10-07 NOTE — DISCHARGE INSTRUCTIONS
No concerning findings found on your CT scan, EKG, or heart enzyme test.  Suspect you bruised a rib or strained a muscle that is between your ribs.  Ice for the first couple days may help and then use heat.  Symptoms often worsen over first several days before improving. Use tylenol and nsaids (e.g. ibuprofen) as tolerated, and ice as needed for discomfort. Switch ice to heat after several days. Always take nsaids with food to avoid stomach ulcers. See PCP if not significantly improving after 1 week. Please review attached instructions including reasons to return to the emergency department.
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
## 2023-10-08 LAB
ATRIAL RATE - MUSE: 77 BPM
DIASTOLIC BLOOD PRESSURE - MUSE: NORMAL MMHG
INTERPRETATION ECG - MUSE: NORMAL
P AXIS - MUSE: 36 DEGREES
PR INTERVAL - MUSE: 192 MS
QRS DURATION - MUSE: 74 MS
QT - MUSE: 406 MS
QTC - MUSE: 459 MS
R AXIS - MUSE: -7 DEGREES
SYSTOLIC BLOOD PRESSURE - MUSE: NORMAL MMHG
T AXIS - MUSE: 31 DEGREES
VENTRICULAR RATE- MUSE: 77 BPM

## 2024-09-07 ENCOUNTER — HEALTH MAINTENANCE LETTER (OUTPATIENT)
Age: 74
End: 2024-09-07

## 2025-01-29 NOTE — TELEPHONE ENCOUNTER
Action January 29, 2025 11:48 AM MT   Action Taken Called [facility],tt: [facility],  for image to be pushed STAT.        DIAGNOSIS: LEFT LOWER LEG MASS   APPOINTMENT DATE: 02/04/2025   NOTES STATUS DETAILS   OFFICE NOTE from referring provider SELF LAST SEEN:  04/10/2015 - Nicanor Rey MD - [facility]   OFFICE NOTE from other specialist Care Everywhere 01/14/2025 - Hermelinda Colin MD    MRI In process [facility]:  01/24/2025 - Left Tib-Fib    [facility]:  03/26/2015, 08/18/2014 - Left Tib-Fib

## 2025-02-04 ENCOUNTER — PRE VISIT (OUTPATIENT)
Dept: ORTHOPEDICS | Facility: CLINIC | Age: 75
End: 2025-02-04

## 2025-02-18 ENCOUNTER — VIRTUAL VISIT (OUTPATIENT)
Dept: ORTHOPEDICS | Facility: CLINIC | Age: 75
End: 2025-02-18
Payer: MEDICARE

## 2025-02-18 VITALS — HEIGHT: 63 IN | BODY MASS INDEX: 28.7 KG/M2 | WEIGHT: 162 LBS

## 2025-02-18 DIAGNOSIS — D21.9 MYXOMA: Primary | ICD-10-CM

## 2025-02-18 PROCEDURE — 98002 SYNCH AUDIO-VIDEO NEW MOD 45: CPT | Performed by: ORTHOPAEDIC SURGERY

## 2025-02-18 ASSESSMENT — PAIN SCALES - GENERAL: PAINLEVEL_OUTOF10: NO PAIN (0)

## 2025-02-18 NOTE — NURSING NOTE
Current patient location: [address] [place] [id number]    Is the patient currently in the state of [place]? YES    Visit mode: VIDEO    If the visit is dropped, the patient can be reconnected by:VIDEO VISIT: Text to cell phone:   Telephone Information:   Mobile [phone]       Will anyone else be joining the visit? No  (If patient encounters technical issues they should call [phone]    Are changes needed to the allergy or medication list? Pt completed echeck-in an confirms medications and allergies are correct.      Are refills needed on medications prescribed by this physician? NO    Rooming Documentation:  Questionnaire(s) completed    Reason for visit: Consult    Manuel GRIDER

## 2025-02-18 NOTE — LETTER
2/18/2025      Francia Isaac  [address] [facility] [address]      Dear Colleague,    Thank you for referring your patient, Francia Isaac, to the [facility] [place]. Please see a copy of my visit note below.    Virtual Visit Details    Type of service:  Video Visit     Impression: persistent myxoma tumor left posterior calf. Minimal growth over 10 years (2-3 mm). Surgical removal offered as could be causing symptoms.     Plan: patient will consider and follow up with us if she wants the tumor removed.    Diagnosis: Biopsy-proven myxoma left posterior calf    Patient was seen 10 years ago for mass in her left posterior calf.  She has developed symptoms however in the area with some radiation distally in the calf when going up stairs.  She has mentioned this to her primary care clinic in the understandably want her to check back in with us.    She reports she has had a difficult year medically with multiple recent spine surgeries to treat any cauda equina as well as sinus surgeries.    She reports that the calf pain is not necessarily disabling her to the point where she would like the tumor removed but wanted to check back in.    I found and reviewed her previous biopsy which is of the left calf tumor and is interpreted to show a myxoma.    I compared her MRI scan from 2014 which we still had to the scan which was done recently.  I have measured in 1 dimension and increase in size from 3.5 to 3.7 cm and in another dimension from 4.8 to 5.2 cm.  The radiologist interpretation raising the possibility of sarcoma.  I think that perhaps the radiologist did not have the old imaging or in the benefit of  She has been previously biopsied.  To my review the single on the MRI scan is very consistent with a myxomatous tumor.    I reassured Francia that this is not an aggressive type of cancer tumor based on the results of the previous biopsy as well as the very minimal growth over more than  10 years.  She understands this.  I offered her surgical removal to manage her symptoms.  She reported that she has been through a lot of surgeries recently and at this time she will need to think about whether she would like it surgically removed or not.  When she has made that she will contact us.    This was a virtual visit began at 12:58 PM and ended at 1:17 PM.  The total length of the visit was 19 minutes      Again, thank you for allowing me to participate in the care of your patient.        Sincerely,        Nicanor Rey MD    Electronically signed

## 2025-02-18 NOTE — PROGRESS NOTES
Virtual Visit Details    Type of service:  Video Visit     Impression: persistent myxoma tumor left posterior calf. Minimal growth over 10 years (2-3 mm). Surgical removal offered as could be causing symptoms.     Plan: patient will consider and follow up with us if she wants the tumor removed.    Diagnosis: Biopsy-proven myxoma left posterior calf    Patient was seen 10 years ago for mass in her left posterior calf.  She has developed symptoms however in the area with some radiation distally in the calf when going up stairs.  She has mentioned this to her primary care clinic in the understandably want her to check back in with us.    She reports she has had a difficult year medically with multiple recent spine surgeries to treat any cauda equina as well as sinus surgeries.    She reports that the calf pain is not necessarily disabling her to the point where she would like the tumor removed but wanted to check back in.    I found and reviewed her previous biopsy which is of the left calf tumor and is interpreted to show a myxoma.    I compared her MRI scan from 2014 which we still had to the scan which was done recently.  I have measured in 1 dimension and increase in size from 3.5 to 3.7 cm and in another dimension from 4.8 to 5.2 cm.  The radiologist interpretation raising the possibility of sarcoma.  I think that perhaps the radiologist did not have the old imaging or in the benefit of  She has been previously biopsied.  To my review the single on the MRI scan is very consistent with a myxomatous tumor.    I reassured Francia that this is not an aggressive type of cancer tumor based on the results of the previous biopsy as well as the very minimal growth over more than 10 years.  She understands this.  I offered her surgical removal to manage her symptoms.  She reported that she has been through a lot of surgeries recently and at this time she will need to think about whether she would like it surgically removed or  not.  When she has made that she will contact us.    This was a virtual visit began at 12:58 PM and ended at 1:17 PM.  The total length of the visit was 19 minutes

## 2025-02-18 NOTE — PATIENT INSTRUCTIONS
Impression: persistent myxoma tumor left posterior calf. Minimal growth over 10 years (2-3 mm). Surgical removal offered as could be causing symptoms.     Plan: patient will consider and follow up with us if she wants the tumor removed.

## (undated) RX ORDER — KETOROLAC TROMETHAMINE 15 MG/ML
INJECTION, SOLUTION INTRAMUSCULAR; INTRAVENOUS
Status: DISPENSED
Start: 2023-10-06